# Patient Record
Sex: FEMALE | Race: WHITE
[De-identification: names, ages, dates, MRNs, and addresses within clinical notes are randomized per-mention and may not be internally consistent; named-entity substitution may affect disease eponyms.]

---

## 2017-06-03 ENCOUNTER — HOSPITAL ENCOUNTER (EMERGENCY)
Dept: HOSPITAL 84 - D.ER | Age: 62
Discharge: HOME | End: 2017-06-03
Payer: COMMERCIAL

## 2017-06-03 VITALS — BODY MASS INDEX: 22.5 KG/M2
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
## 2017-06-03 DIAGNOSIS — S82.831A: Primary | ICD-10-CM
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
## 2017-06-07 LAB
ERYTHROCYTE [DISTWIDTH] IN BLOOD BY AUTOMATED COUNT: 13.5 % (ref 11.5–14.5)
HCT VFR BLD CALC: 41.4 % (ref 36–48)
HGB BLD-MCNC: 13.9 G/DL (ref 12–16)
MCH RBC QN AUTO: 32.2 PG (ref 26–34)
MCHC RBC AUTO-ENTMCNC: 33.6 G/DL (ref 31–37)
MCV RBC: 95.8 FL (ref 80–100)
PLATELET # BLD: 329 10X3/UL (ref 130–400)
PMV BLD AUTO: 10.4 FL (ref 7.4–10.4)
RBC # BLD AUTO: 4.32 10X6/UL (ref 4–5.4)
WBC # BLD AUTO: 8.4 10X3/UL (ref 4.8–10.8)

## 2017-06-08 ENCOUNTER — HOSPITAL ENCOUNTER (OUTPATIENT)
Dept: HOSPITAL 84 - D.OPS | Age: 62
Discharge: HOME | End: 2017-06-08
Attending: ORTHOPAEDIC SURGERY
Payer: COMMERCIAL

## 2017-06-08 VITALS — BODY MASS INDEX: 22.58 KG/M2 | HEIGHT: 60 IN | BODY MASS INDEX: 22.58 KG/M2 | WEIGHT: 115 LBS

## 2017-06-08 VITALS — SYSTOLIC BLOOD PRESSURE: 137 MMHG | DIASTOLIC BLOOD PRESSURE: 73 MMHG

## 2017-06-08 DIAGNOSIS — Z01.812: ICD-10-CM
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
## 2017-06-08 DIAGNOSIS — S82.61XA: Primary | ICD-10-CM

## 2017-06-12 NOTE — OP
PATIENT NAME:  YAIMA MEDINA                           MEDICAL RECORD: [id number]
:57                                             LOCATION:D.OPS          
                                                         ADMISSION DATE:        
SURGEON:  MANNY LEE MD        
 
 
DATE OF OPERATION:  2017
 
PREOPERATIVE DIAGNOSIS:  Displaced fracture of the lateral malleolus of the
right ankle.
 
POSTOPERATIVE DIAGNOSIS:  Displaced fracture of the lateral malleolus of the
right ankle.
 
PROCEDURE:  Open reduction internal fixation of displaced fracture of the
lateral malleolus of the right ankle.
 
SURGEON:  Manny Lee MD.
 
ANESTHESIA:  General.
 
INTRAOPERATIVE COMPLICATIONS:  None.
 
SUMMARY OF PATHOLOGIC FINDINGS:  The patient has displaced lateral malleolus
fracture with a long oblique extension.
 
OPERATIVE SUMMARY IN DETAIL:  After obtaining the appropriate preoperative
orthopedic surgery consent, as well as anesthetic consultation, evaluation and
clearance, the patient was brought to the operating room and placed on the
operating table in supine position.  After general laryngeal mask was
administered, a tourniquet was placed at the proximal aspect of the right lower
extremity.  Right lower extremity was then prepped and draped in a routine
sterile fashion.  Leg was elevated, exsanguinated and tourniquet was inflated to
350 mmHg.  A lateral incision made directly over the fibula, taken down to the
periosteum, and the fracture was then cleared of all fracture hematoma. 
Reduction maneuver was performed and held in place with a reduction forceps
while a [place] VariAx plate was applied.  Serial and sequential drill and fill
was done with a combination of both compression and locking screws under
fluoroscopic guidance.  After the plate was well affixed, AP and lateral, as
well as mortise views were taken and submitted for radiology review.  The wound
was copiously irrigated and closed with 2-0 Vicryl followed by skin staples. 
Sterile dressings were applied.  Tourniquet was deflated and a posterior L&U
splint was applied.  The patient was awakened and taken to the recovery room in
stable condition.  All final needle and sponge counts were correct.
 
TRANSINT:[id number] Voice Confirmation ID: [id number] DOCUMENT ID: [id number]
                                           
                                           MANNY LEE MD        
 
 
 
Electronically Signed by MANNY LEE MD on 17 at 0922
CC:                                                             [id number]
DICTATION DATE: 17 1522     :     17 0047      [facility] 
                                                                      17
[facility]                                          
[address] [place] [address]

## 2017-11-24 ENCOUNTER — HOSPITAL ENCOUNTER (EMERGENCY)
Dept: HOSPITAL 84 - D.ER | Age: 62
Discharge: HOME | End: 2017-11-24
Payer: COMMERCIAL

## 2017-11-24 VITALS — BODY MASS INDEX: 22.5 KG/M2

## 2017-11-24 DIAGNOSIS — R51: Primary | ICD-10-CM

## 2017-11-24 LAB
ALBUMIN SERPL-MCNC: 3.6 G/DL (ref 3.4–5)
ALP SERPL-CCNC: 71 U/L (ref 46–116)
ALT SERPL-CCNC: 23 U/L (ref 10–68)
ANION GAP SERPL CALC-SCNC: 11.9 MMOL/L (ref 8–16)
BASOPHILS NFR BLD AUTO: 0.2 % (ref 0–2)
BILIRUB SERPL-MCNC: 0.42 MG/DL (ref 0.2–1.3)
BUN SERPL-MCNC: 19 MG/DL (ref 7–18)
CALCIUM SERPL-MCNC: 8.8 MG/DL (ref 8.5–10.1)
CHLORIDE SERPL-SCNC: 103 MMOL/L (ref 98–107)
CO2 SERPL-SCNC: 25.7 MMOL/L (ref 21–32)
CREAT SERPL-MCNC: 0.7 MG/DL (ref 0.6–1.3)
EOSINOPHIL NFR BLD: 0.7 % (ref 0–7)
ERYTHROCYTE [DISTWIDTH] IN BLOOD BY AUTOMATED COUNT: 13.1 % (ref 11.5–14.5)
GLOBULIN SER-MCNC: 3.4 G/L
GLUCOSE SERPL-MCNC: 107 MG/DL (ref 74–106)
HCT VFR BLD CALC: 42.1 % (ref 36–48)
HGB BLD-MCNC: 14.2 G/DL (ref 12–16)
IMM GRANULOCYTES NFR BLD: 0.2 % (ref 0–5)
LYMPHOCYTES NFR BLD AUTO: 21.2 % (ref 15–50)
MCH RBC QN AUTO: 31.8 PG (ref 26–34)
MCHC RBC AUTO-ENTMCNC: 33.7 G/DL (ref 31–37)
MCV RBC: 94.4 FL (ref 80–100)
MONOCYTES NFR BLD: 9.1 % (ref 2–11)
NEUTROPHILS NFR BLD AUTO: 68.6 % (ref 40–80)
OSMOLALITY SERPL CALC.SUM OF ELEC: 275 MOSM/KG (ref 275–300)
PLATELET # BLD: 329 10X3/UL (ref 130–400)
PMV BLD AUTO: 9.9 FL (ref 7.4–10.4)
POTASSIUM SERPL-SCNC: 3.6 MMOL/L (ref 3.5–5.1)
PROT SERPL-MCNC: 7 G/DL (ref 6.4–8.2)
RBC # BLD AUTO: 4.46 10X6/UL (ref 4–5.4)
SODIUM SERPL-SCNC: 137 MMOL/L (ref 136–145)
WBC # BLD AUTO: 8.7 10X3/UL (ref 4.8–10.8)

## 2018-02-01 ENCOUNTER — HOSPITAL ENCOUNTER (OUTPATIENT)
Dept: HOSPITAL 84 - D.MAMMO | Age: 63
Discharge: HOME | End: 2018-02-01
Attending: FAMILY MEDICINE
Payer: COMMERCIAL

## 2018-02-01 VITALS — BODY MASS INDEX: 22.5 KG/M2

## 2018-02-01 DIAGNOSIS — Z12.31: Primary | ICD-10-CM

## 2019-02-13 ENCOUNTER — HOSPITAL ENCOUNTER (OUTPATIENT)
Dept: HOSPITAL 84 - D.MAMMO | Age: 64
Discharge: HOME | End: 2019-02-13
Attending: FAMILY MEDICINE
Payer: COMMERCIAL

## 2019-02-13 VITALS — BODY MASS INDEX: 22.5 KG/M2

## 2019-02-13 DIAGNOSIS — Z12.31: Primary | ICD-10-CM
